# Patient Record
Sex: FEMALE
[De-identification: names, ages, dates, MRNs, and addresses within clinical notes are randomized per-mention and may not be internally consistent; named-entity substitution may affect disease eponyms.]

---

## 2018-07-08 LAB
BLD SMEAR INTERP: (no result)
HCT VFR BLD CALC: 34.3 % (ref 36–45)
INR BLD: 0.92
LYMPHOCYTES # SPEC AUTO: 1.2 K/UL (ref 0.7–4.9)
MCH RBC QN AUTO: 27.5 PG (ref 27–35)
MCV RBC: 83.6 FL (ref 80–100)
MORPHOLOGY BLD-IMP: (no result)
PMV BLD: 11.1 FL (ref 7.6–11.3)
RBC # BLD: 4.11 M/UL (ref 3.86–4.86)
RPR SER QL: (no result)

## 2018-07-09 ENCOUNTER — HOSPITAL ENCOUNTER (INPATIENT)
Dept: HOSPITAL 97 - 2ND-WC | Age: 38
LOS: 2 days | Discharge: HOME | End: 2018-07-11
Attending: SPECIALIST | Admitting: SPECIALIST
Payer: COMMERCIAL

## 2018-07-09 VITALS — BODY MASS INDEX: 33.3 KG/M2

## 2018-07-09 DIAGNOSIS — Z3A.39: ICD-10-CM

## 2018-07-09 DIAGNOSIS — D69.6: ICD-10-CM

## 2018-07-09 DIAGNOSIS — N85.8: ICD-10-CM

## 2018-07-09 DIAGNOSIS — O34.211: Primary | ICD-10-CM

## 2018-07-09 DIAGNOSIS — O36.63X0: ICD-10-CM

## 2018-07-09 PROCEDURE — 86901 BLOOD TYPING SEROLOGIC RH(D): CPT

## 2018-07-09 PROCEDURE — 85025 COMPLETE CBC W/AUTO DIFF WBC: CPT

## 2018-07-09 PROCEDURE — 86850 RBC ANTIBODY SCREEN: CPT

## 2018-07-09 PROCEDURE — 85730 THROMBOPLASTIN TIME PARTIAL: CPT

## 2018-07-09 PROCEDURE — 86592 SYPHILIS TEST NON-TREP QUAL: CPT

## 2018-07-09 PROCEDURE — 88307 TISSUE EXAM BY PATHOLOGIST: CPT

## 2018-07-09 PROCEDURE — 85610 PROTHROMBIN TIME: CPT

## 2018-07-09 PROCEDURE — 85014 HEMATOCRIT: CPT

## 2018-07-09 PROCEDURE — 86900 BLOOD TYPING SEROLOGIC ABO: CPT

## 2018-07-09 PROCEDURE — 36415 COLL VENOUS BLD VENIPUNCTURE: CPT

## 2018-07-09 RX ADMIN — KETOROLAC TROMETHAMINE PRN MG: 30 INJECTION, SOLUTION INTRAMUSCULAR at 20:58

## 2018-07-09 NOTE — PN
Full preop talk.  No questions asked.  The patient did not notify my office or complete her requireme
nts for having a tubal, therefore, we will not proceed with tubal.  Further discussion with the patie
nt,  about forthcoming surgery.





LANI/MIRYAM

DD:  07/09/2018 11:10:57Voice ID:  [id number]

DT:  07/09/2018 15:17:02Report ID:  [id number]

## 2018-07-09 NOTE — OP
Surgeon:  Napoleon Rivera MD



Indication:  A 37-year-old  2, para 1, previous  section for repeat  section 3
9 weeks 2 days.  Full preoperative counseling.  Concerning procedure and possible complications inclu
ding infection; blood loss; anesthetic complications; injury to bladder, bowel, or ureter; postoperat
freya complications; clots in legs; and pneumonia.  The patient knows fully well this does not constitu
te all the possible problems that could occur during or following surgery and knows with each surgery
 the risks of complications is greater.  At one point, she expressed desire for sterilization, but th
en apparently changed her mind and made no arrangements with my office.  We have talked about ParaGar
d IUD 6-8 weeks postoperatively.



Procedure In Detail:  The patient was prepped and draped after spinal block anesthesia.  Timeout was 
performed.  A Pfannenstiel incision was created over the previous incision site.  The incision was ca
rried to the fascia.  The fascia was incised and the incision carried transversely bilaterally.  Ante
rior fascial plane was developed with both blunt and sharp dissection.  The underlying rectus muscle 
was  and low transverse uterine incision created.  Vacuum suction was first attempted, but t
his did not affect delivery, therefore forceps were applied.  A 10-pound 8-ounce female was delivered
 without difficulty.  Apgars 9 and 9.  Cord blood specimen was obtained.  The placenta was removed ma
nually, cleared of clot and blood, and exteriorized.  Cervical os was dilated with ring clamp.  The u
terus was mildly hypotonic.  Methergine 0.2 mg as well as IV drip Pitocin and massage.  Estimated blo
od loss thought to be around 1100, possibly 1200 cc.  Uterus was closed with a running-locked stitch 
of 1 chromic.  Suture line checked and no further bleeding seen.  Gutters were cleared of clot and bl
ood.  Uterus was replaced in the peritoneal cavity and again inspection of the suture line showed no 
further bleeding.  Muscle was closed with 0 Vicryl 4 stitches to reapproximate the muscles in the mid
line.  The fascia was then closed with 1 Vicryl running from either angle to the midline.  Subcutaneo
us tissue was closed with 2-0 plain.  Absorbable staple was placed and then metal staples.  The patie
nt had been given 2 g of Ancef for prophylaxis.  Tolerated all procedures well.  She was transferred 
back to her room in good condition.



Final Diagnoses:  Term intrauterine pregnancy.  Repeat  section.  Spinal block anesthesia.  M
ild uterine hypertonus.  Fetal macrosomia.  10 pounds 8 ounces.





LANI/MIRYAM

DD:  2018 12:28:19Voice ID:  [id number]

DT:  2018 23:11:48Report ID:  [id number]

## 2018-07-10 RX ADMIN — KETOROLAC TROMETHAMINE PRN MG: 30 INJECTION, SOLUTION INTRAMUSCULAR at 04:30

## 2018-07-10 RX ADMIN — OXYCODONE AND ACETAMINOPHEN PRN TAB: 5; 325 TABLET ORAL at 10:44

## 2018-07-10 RX ADMIN — OXYCODONE AND ACETAMINOPHEN PRN TAB: 5; 325 TABLET ORAL at 23:30

## 2018-07-10 RX ADMIN — OXYCODONE AND ACETAMINOPHEN PRN TAB: 5; 325 TABLET ORAL at 16:27

## 2018-07-10 NOTE — PN
Postoperatively done quite well.  No CNS symptoms from spinal block.  Output is good.  Vital signs ar
e all normal.  H and H with expected change.  Lochia is normal.  Will discontinue Mckeon and IV.  Begi
n ambulation and p.o. intake.  The patient has not had her Tdap, immunization during the pregnancy an
d does not seem to be interested now either but it was offered. We will observe the patient today, se
nd her home tomorrow.  Full post discharge instructions given but will go over it again tomorrow.





NBC/MODL

DD:  07/10/2018 06:49:48Voice ID:  [id number]

DT:  07/10/2018 07:17:28Report ID:  [id number]

## 2018-07-11 VITALS — SYSTOLIC BLOOD PRESSURE: 126 MMHG | TEMPERATURE: 98.7 F | DIASTOLIC BLOOD PRESSURE: 74 MMHG

## 2018-07-11 RX ADMIN — OXYCODONE AND ACETAMINOPHEN PRN TAB: 5; 325 TABLET ORAL at 05:50

## 2018-07-12 NOTE — DS
Date of Discharge:  2018



Hospital Course:  This is a 37-year-old  2, para 1, 39 weeks 2 days, repeat  section, 
delivered of a 10 pound 8 ounce female.  Apgars 9 and 9.  Spinal block anesthesia.  Mild uterine hypo
tonus.  0.2 mg of Methergine IM.  Estimated blood loss 1100 cc.  Ancef for prophylaxis both pre and p
ostop.  Postoperatively, she has done well, is afebrile, ambulating, voiding.  Lochia is normal.  No 
post spinal block problems.  Tdap has been offered to her numerous times during the pregnancy and aga
in during this hospital stay.  She will be dismissed later this morning to report back to my office n
ext week for followup, to report any temperature elevation of 100 degrees or greater, severe pain, he
rosa bleeding, or any other type of abnormalities.  Dismissed with tramadol for analgesia.  She knows 
this goes through the breast milk, and if she prefers, she can take Motrin instead.



Final Diagnoses:  

1.Term intrauterine pregnancy.

2.Repeat  section.

3.Fetal macrosomia.

4.Mild uterine hypotonus.





LANI/MIRYAM

DD:  2018 07:09:30Voice ID:  [id number]

DT:  2018 01:45:28Report ID:  [id number]